# Patient Record
Sex: FEMALE | Race: ASIAN | NOT HISPANIC OR LATINO | URBAN - METROPOLITAN AREA
[De-identification: names, ages, dates, MRNs, and addresses within clinical notes are randomized per-mention and may not be internally consistent; named-entity substitution may affect disease eponyms.]

---

## 2018-05-25 ENCOUNTER — EMERGENCY (EMERGENCY)
Facility: HOSPITAL | Age: 28
LOS: 1 days | Discharge: HOME | End: 2018-05-25
Attending: EMERGENCY MEDICINE
Payer: MEDICAID

## 2018-05-25 VITALS — HEIGHT: 60 IN | WEIGHT: 115.08 LBS

## 2018-05-25 DIAGNOSIS — F32.2 MAJOR DEPRESSIVE DISORDER, SINGLE EPISODE, SEVERE WITHOUT PSYCHOTIC FEATURES: ICD-10-CM

## 2018-05-25 DIAGNOSIS — F43.10 POST-TRAUMATIC STRESS DISORDER, UNSPECIFIED: ICD-10-CM

## 2018-05-25 DIAGNOSIS — R69 ILLNESS, UNSPECIFIED: ICD-10-CM

## 2018-05-25 LAB
ALBUMIN SERPL ELPH-MCNC: 4 G/DL — SIGNIFICANT CHANGE UP (ref 3.3–5.2)
ALP SERPL-CCNC: 82 U/L — SIGNIFICANT CHANGE UP (ref 40–120)
ALT FLD-CCNC: 15 U/L — SIGNIFICANT CHANGE UP
ANION GAP SERPL CALC-SCNC: 16 MMOL/L — SIGNIFICANT CHANGE UP (ref 5–17)
APAP SERPL-MCNC: <7.5 UG/ML — LOW (ref 10–26)
AST SERPL-CCNC: 14 U/L — SIGNIFICANT CHANGE UP
BASOPHILS # BLD AUTO: 0 K/UL — SIGNIFICANT CHANGE UP (ref 0–0.2)
BASOPHILS NFR BLD AUTO: 0.3 % — SIGNIFICANT CHANGE UP (ref 0–2)
BILIRUB SERPL-MCNC: 0.5 MG/DL — SIGNIFICANT CHANGE UP (ref 0.4–2)
BUN SERPL-MCNC: 12 MG/DL — SIGNIFICANT CHANGE UP (ref 8–20)
CALCIUM SERPL-MCNC: 9.6 MG/DL — SIGNIFICANT CHANGE UP (ref 8.6–10.2)
CHLORIDE SERPL-SCNC: 102 MMOL/L — SIGNIFICANT CHANGE UP (ref 98–107)
CO2 SERPL-SCNC: 25 MMOL/L — SIGNIFICANT CHANGE UP (ref 22–29)
CREAT SERPL-MCNC: 0.63 MG/DL — SIGNIFICANT CHANGE UP (ref 0.5–1.3)
EOSINOPHIL # BLD AUTO: 0.4 K/UL — SIGNIFICANT CHANGE UP (ref 0–0.5)
EOSINOPHIL NFR BLD AUTO: 4.5 % — SIGNIFICANT CHANGE UP (ref 0–6)
GLUCOSE SERPL-MCNC: 94 MG/DL — SIGNIFICANT CHANGE UP (ref 70–115)
HCT VFR BLD CALC: 36.2 % — LOW (ref 37–47)
HGB BLD-MCNC: 11.5 G/DL — LOW (ref 12–16)
LYMPHOCYTES # BLD AUTO: 1.7 K/UL — SIGNIFICANT CHANGE UP (ref 1–4.8)
LYMPHOCYTES # BLD AUTO: 18.1 % — LOW (ref 20–55)
MCHC RBC-ENTMCNC: 27.3 PG — SIGNIFICANT CHANGE UP (ref 27–31)
MCHC RBC-ENTMCNC: 31.8 G/DL — LOW (ref 32–36)
MCV RBC AUTO: 86 FL — SIGNIFICANT CHANGE UP (ref 81–99)
MONOCYTES # BLD AUTO: 0.6 K/UL — SIGNIFICANT CHANGE UP (ref 0–0.8)
MONOCYTES NFR BLD AUTO: 6.3 % — SIGNIFICANT CHANGE UP (ref 3–10)
NEUTROPHILS # BLD AUTO: 6.6 K/UL — SIGNIFICANT CHANGE UP (ref 1.8–8)
NEUTROPHILS NFR BLD AUTO: 70.6 % — SIGNIFICANT CHANGE UP (ref 37–73)
PLATELET # BLD AUTO: 317 K/UL — SIGNIFICANT CHANGE UP (ref 150–400)
POTASSIUM SERPL-MCNC: 3.8 MMOL/L — SIGNIFICANT CHANGE UP (ref 3.5–5.3)
POTASSIUM SERPL-SCNC: 3.8 MMOL/L — SIGNIFICANT CHANGE UP (ref 3.5–5.3)
PROT SERPL-MCNC: 7.5 G/DL — SIGNIFICANT CHANGE UP (ref 6.6–8.7)
RBC # BLD: 4.21 M/UL — LOW (ref 4.4–5.2)
RBC # FLD: 13.8 % — SIGNIFICANT CHANGE UP (ref 11–15.6)
SALICYLATES SERPL-MCNC: <0.6 MG/DL — LOW (ref 10–20)
SODIUM SERPL-SCNC: 143 MMOL/L — SIGNIFICANT CHANGE UP (ref 135–145)
WBC # BLD: 9.4 K/UL — SIGNIFICANT CHANGE UP (ref 4.8–10.8)
WBC # FLD AUTO: 9.4 K/UL — SIGNIFICANT CHANGE UP (ref 4.8–10.8)

## 2018-05-25 PROCEDURE — 99284 EMERGENCY DEPT VISIT MOD MDM: CPT

## 2018-05-25 PROCEDURE — 99285 EMERGENCY DEPT VISIT HI MDM: CPT

## 2018-05-25 PROCEDURE — 93010 ELECTROCARDIOGRAM REPORT: CPT

## 2018-05-25 RX ORDER — NICOTINE POLACRILEX 2 MG
1 GUM BUCCAL DAILY
Qty: 0 | Refills: 0 | Status: DISCONTINUED | OUTPATIENT
Start: 2018-05-25 | End: 2018-05-30

## 2018-05-25 RX ORDER — TETANUS TOXOID, REDUCED DIPHTHERIA TOXOID AND ACELLULAR PERTUSSIS VACCINE, ADSORBED 5; 2.5; 8; 8; 2.5 [IU]/.5ML; [IU]/.5ML; UG/.5ML; UG/.5ML; UG/.5ML
0.5 SUSPENSION INTRAMUSCULAR ONCE
Qty: 0 | Refills: 0 | Status: COMPLETED | OUTPATIENT
Start: 2018-05-25 | End: 2018-05-25

## 2018-05-25 RX ORDER — OXYCODONE AND ACETAMINOPHEN 5; 325 MG/1; MG/1
1 TABLET ORAL ONCE
Qty: 0 | Refills: 0 | Status: DISCONTINUED | OUTPATIENT
Start: 2018-05-25 | End: 2018-05-25

## 2018-05-25 RX ADMIN — OXYCODONE AND ACETAMINOPHEN 1 TABLET(S): 5; 325 TABLET ORAL at 22:48

## 2018-05-25 RX ADMIN — OXYCODONE AND ACETAMINOPHEN 1 TABLET(S): 5; 325 TABLET ORAL at 23:53

## 2018-05-25 RX ADMIN — Medication 1 PATCH: at 19:13
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
## 2018-05-25 NOTE — ED BEHAVIORAL HEALTH ASSESSMENT NOTE - SUICIDE RISK FACTORS
Hopelessness/Highly impulsive behavior/Agitation/severe anxiety/Unable to engage in safety planning/Access to means (pills, firearms, etc.)/Chronic pain or acute medical issue/History of abuse/trauma

## 2018-05-25 NOTE — ED ADULT NURSE NOTE - SUICIDAL DETAILS
specific plan/suicidal thoughts/specific attempt/cutting her arms, attempted to drAIN BLOOD FROM HER VEIN

## 2018-05-25 NOTE — ED BEHAVIORAL HEALTH ASSESSMENT NOTE - PRIMARY DX
Deferred diagnosis on axis II Major depressive disorder, single episode, severe without psychotic features
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
## 2018-05-25 NOTE — ED ADULT NURSE NOTE - CHPI ED SYMPTOMS NEG
no disorientation/no weakness/no change in level of consciousness/no weight loss/no agitation/no confusion/no homicidal/no paranoia/no hallucinations

## 2018-05-25 NOTE — ED ADULT NURSE NOTE - SUBSTANCE USE COMMENT, PROFILE
Smoked pot as a teen, only used Heroin for last 3 weeks. Sister  from an over dose on Heroin May 9, 2017

## 2018-05-25 NOTE — ED ADULT NURSE REASSESSMENT NOTE - NS ED NURSE REASSESS COMMENT FT1
Assumed care of pt @1930. Pt reports she tried "to end it". pt reports she starting using IV heroine x3 weeks ago and has been depressed over her sister's death. Pt denies any prior SA or SIB. Attempted blood draw but was unsuccessful. Lab notified. pt has bilateral bruising and swelling to both upper extremities. Pt guarded and currently resting comfortably on stretcher. Psych NP by pt's bedside. Will continue to monitor the pt for safety. Assumed care of pt @1930. Pt reports she tried "to end it". pt reports she started using IV heroine x3 weeks ago and has been depressed over her sister's death. Pt denies any prior SA or SIB. Attempted blood draw but was unsuccessful. Lab notified. pt has bilateral bruising and swelling to both upper extremities. Pt guarded and currently resting comfortably on stretcher. Psych NP by pt's bedside. Will continue to monitor the pt for safety.

## 2018-05-25 NOTE — ED BEHAVIORAL HEALTH ASSESSMENT NOTE - DESCRIPTION
Calm, cooperative.  B/L upper extremity ecchymosis; track marks, welts, b/l superficial vertical cutting marks to b/l forearms. Trigeminal Neuralgia Lived with boyfriend
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
## 2018-05-25 NOTE — ED PROVIDER NOTE - PHYSICAL EXAMINATION
b/l upper extremity ecchymoses;  track marks, welts, b/l superficial vertical cutting marks to b/l forearms    b/l feet also have track marks and ecchymoses;     no areas of erythema, fluctuance, purulence or drainage.
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
## 2018-05-26 VITALS
RESPIRATION RATE: 18 BRPM | TEMPERATURE: 98 F | HEART RATE: 85 BPM | OXYGEN SATURATION: 99 % | DIASTOLIC BLOOD PRESSURE: 63 MMHG | SYSTOLIC BLOOD PRESSURE: 104 MMHG

## 2018-05-26 LAB
AMPHET UR-MCNC: NEGATIVE — SIGNIFICANT CHANGE UP
APPEARANCE UR: CLEAR — SIGNIFICANT CHANGE UP
BACTERIA # UR AUTO: ABNORMAL
BARBITURATES UR SCN-MCNC: NEGATIVE — SIGNIFICANT CHANGE UP
BENZODIAZ UR-MCNC: NEGATIVE — SIGNIFICANT CHANGE UP
BILIRUB UR-MCNC: NEGATIVE — SIGNIFICANT CHANGE UP
COCAINE METAB.OTHER UR-MCNC: POSITIVE
COLOR SPEC: YELLOW — SIGNIFICANT CHANGE UP
DIFF PNL FLD: NEGATIVE — SIGNIFICANT CHANGE UP
EPI CELLS # UR: SIGNIFICANT CHANGE UP
GLUCOSE UR QL: NEGATIVE MG/DL — SIGNIFICANT CHANGE UP
KETONES UR-MCNC: ABNORMAL
LEUKOCYTE ESTERASE UR-ACNC: ABNORMAL
METHADONE UR-MCNC: NEGATIVE — SIGNIFICANT CHANGE UP
NITRITE UR-MCNC: NEGATIVE — SIGNIFICANT CHANGE UP
OPIATES UR-MCNC: POSITIVE
PCP SPEC-MCNC: SIGNIFICANT CHANGE UP
PCP UR-MCNC: NEGATIVE — SIGNIFICANT CHANGE UP
PH UR: 6.5 — SIGNIFICANT CHANGE UP (ref 5–8)
PROT UR-MCNC: 30 MG/DL
RBC CASTS # UR COMP ASSIST: SIGNIFICANT CHANGE UP /HPF (ref 0–4)
SP GR SPEC: 1.02 — SIGNIFICANT CHANGE UP (ref 1.01–1.02)
THC UR QL: NEGATIVE — SIGNIFICANT CHANGE UP
UROBILINOGEN FLD QL: 8 MG/DL
WBC UR QL: ABNORMAL

## 2018-05-26 PROCEDURE — 81001 URINALYSIS AUTO W/SCOPE: CPT

## 2018-05-26 PROCEDURE — 93005 ELECTROCARDIOGRAM TRACING: CPT

## 2018-05-26 PROCEDURE — 36415 COLL VENOUS BLD VENIPUNCTURE: CPT

## 2018-05-26 PROCEDURE — 99285 EMERGENCY DEPT VISIT HI MDM: CPT

## 2018-05-26 PROCEDURE — 80307 DRUG TEST PRSMV CHEM ANLYZR: CPT

## 2018-05-26 PROCEDURE — 80053 COMPREHEN METABOLIC PANEL: CPT

## 2018-05-26 PROCEDURE — 85027 COMPLETE CBC AUTOMATED: CPT

## 2018-05-26 RX ORDER — AMITRIPTYLINE HCL 25 MG
10 TABLET ORAL ONCE
Qty: 0 | Refills: 0 | Status: COMPLETED | OUTPATIENT
Start: 2018-05-26 | End: 2018-05-26

## 2018-05-26 RX ORDER — CLONAZEPAM 1 MG
0.5 TABLET ORAL DAILY
Qty: 0 | Refills: 0 | Status: COMPLETED | OUTPATIENT
Start: 2018-05-26 | End: 2018-06-02

## 2018-05-26 RX ORDER — AMITRIPTYLINE HCL 25 MG
10 TABLET ORAL AT BEDTIME
Qty: 0 | Refills: 0 | Status: DISCONTINUED | OUTPATIENT
Start: 2018-05-26 | End: 2018-05-30

## 2018-05-26 RX ORDER — CLONAZEPAM 1 MG
1 TABLET ORAL
Qty: 0 | Refills: 0 | COMMUNITY

## 2018-05-26 RX ORDER — OXYCODONE AND ACETAMINOPHEN 5; 325 MG/1; MG/1
2 TABLET ORAL EVERY 12 HOURS
Qty: 0 | Refills: 0 | Status: COMPLETED | OUTPATIENT
Start: 2018-05-26 | End: 2018-06-02

## 2018-05-26 RX ORDER — CLONAZEPAM 1 MG
0.5 TABLET ORAL ONCE
Qty: 0 | Refills: 0 | Status: DISCONTINUED | OUTPATIENT
Start: 2018-05-26 | End: 2018-05-26

## 2018-05-26 RX ORDER — OXYCODONE AND ACETAMINOPHEN 5; 325 MG/1; MG/1
2 TABLET ORAL EVERY 24 HOURS
Qty: 0 | Refills: 0 | Status: DISCONTINUED | OUTPATIENT
Start: 2018-05-26 | End: 2018-05-26

## 2018-05-26 RX ORDER — OXYCODONE AND ACETAMINOPHEN 5; 325 MG/1; MG/1
1 TABLET ORAL ONCE
Qty: 0 | Refills: 0 | Status: DISCONTINUED | OUTPATIENT
Start: 2018-05-26 | End: 2018-05-26

## 2018-05-26 RX ADMIN — OXYCODONE AND ACETAMINOPHEN 2 TABLET(S): 5; 325 TABLET ORAL at 09:25

## 2018-05-26 RX ADMIN — Medication 0.5 MILLIGRAM(S): at 00:39

## 2018-05-26 RX ADMIN — OXYCODONE AND ACETAMINOPHEN 1 TABLET(S): 5; 325 TABLET ORAL at 00:39

## 2018-05-26 RX ADMIN — OXYCODONE AND ACETAMINOPHEN 1 TABLET(S): 5; 325 TABLET ORAL at 01:26

## 2018-05-26 RX ADMIN — OXYCODONE AND ACETAMINOPHEN 2 TABLET(S): 5; 325 TABLET ORAL at 10:20

## 2018-05-26 RX ADMIN — Medication 10 MILLIGRAM(S): at 00:39

## 2018-05-26 RX ADMIN — Medication 0.5 MILLIGRAM(S): at 09:27

## 2018-05-26 NOTE — ED ADULT NURSE REASSESSMENT NOTE - NS ED NURSE REASSESS COMMENT FT1
pt currently resting/sleeping comfortably, in no apparent discomfort or distress. Respirations even & unlabored. No pt complaints at this time. No harm to self. Safety maintained. Will continue to monitor the pt for safety.

## 2018-05-26 NOTE — ED ADULT NURSE REASSESSMENT NOTE - NS ED NURSE REASSESS COMMENT FT1
Patient received Percocet 2 tablets PO at 0925 for chronic nerve damage pain rated an 8.  Pain level reduced to manageable level post intervention.  Patient able to eat breakfast after intervention.  No attempts to harm self or others.  Agrees with inpatient transfer to psychiatric facility.  Awaiting acceptance to inpatient unit.

## 2018-05-26 NOTE — ED ADULT NURSE REASSESSMENT NOTE - STATUS
awaiting consult
awaiting transfer, no change

## 2018-05-26 NOTE — ED ADULT NURSE REASSESSMENT NOTE - NS ED NURSE REASSESS COMMENT FT1
Assumed care of patient at 0715.  Patient received resting in bed awake.  Patient endorsed she has been unable to sleep.  Educated about plan of care and need for urine sample.  Patient ambulated to bathroom independently and provided urine.  No attempts to harm self or others.  Patient agrees with plan for pending transfer.  Safety of patient maintained.

## 2018-05-26 NOTE — ED BEHAVIORAL HEALTH NOTE - BEHAVIORAL HEALTH NOTE
Social Work Note: as per psych NP patient will benefit from inpatient mental health treatment at this time. Clinicals were faxed to [facility] for review. sw to follow.

## 2018-05-26 NOTE — ED ADULT NURSE REASSESSMENT NOTE - COMFORT CARE
darkened lights/meal provided/po fluids offered/repositioned/wait time explained
darkened lights
warm blanket provided/darkened lights/wait time explained/plan of care explained/repositioned/po fluids offered
meal provided/plan of care explained
ambulated to bathroom/plan of care explained

## 2018-05-26 NOTE — ED ADULT NURSE REASSESSMENT NOTE - GENERAL PATIENT STATE
comfortable appearance/resting/sleeping/cooperative
no change observed/cooperative/comfortable appearance/resting/sleeping
resting/sleeping/anxious
resting/sleeping/cooperative
comfortable appearance/resting/sleeping/cooperative

## 2018-05-26 NOTE — ED BEHAVIORAL HEALTH NOTE - BEHAVIORAL HEALTH NOTE
Social Work Note: [facility] (formerly [facility]) has accepted patient at this time for inpatient mental health treatment. This writer spoke with Kathy in Admissions. Patient was transferred to [facility] accepting is Dr. Moya 9.13 status. [facility] was scheduled for trip.

## 2018-05-26 NOTE — ED BEHAVIORAL HEALTH NOTE - BEHAVIORAL HEALTH NOTE
Patient requesting medication for pain. Home medications confirmed with [facility] pharmacy in [place]. Patient is prescribed Amitriptyline 10 mg QHS, Klonopin 0.25 mg ODT daily and Percocet 10mg /325 mg BID. Last prescribed 5/17/18. Medications ordered.

## 2018-05-26 NOTE — ED ADULT NURSE REASSESSMENT NOTE - NS ED NURSE REASSESS COMMENT FT1
Pt resting/sleeping comfortably on stretcher, in no apparent distress. No harm to self. Safety maintained. Urine sample pending. Will continue to monitor the pt and maintain safety.

## 2018-09-01 ENCOUNTER — OUTPATIENT (OUTPATIENT)
Dept: OUTPATIENT SERVICES | Facility: HOSPITAL | Age: 28
LOS: 1 days | End: 2018-09-01
Payer: MEDICAID

## 2018-09-01 PROCEDURE — G9001: CPT

## 2018-09-11 ENCOUNTER — EMERGENCY (EMERGENCY)
Facility: HOSPITAL | Age: 28
LOS: 1 days | Discharge: LEFT BEFORE DISCHARGE | End: 2018-09-11

## 2018-09-11 VITALS — WEIGHT: 119.93 LBS

## 2018-09-12 PROBLEM — S04.30XA INJURY OF TRIGEMINAL NERVE, UNSPECIFIED SIDE, INITIAL ENCOUNTER: Chronic | Status: ACTIVE | Noted: 2018-05-25

## 2018-09-12 PROBLEM — F32.9 MAJOR DEPRESSIVE DISORDER, SINGLE EPISODE, UNSPECIFIED: Chronic | Status: ACTIVE | Noted: 2018-05-25

## 2018-09-14 DIAGNOSIS — Z71.89 OTHER SPECIFIED COUNSELING: ICD-10-CM

## 2021-04-06 ENCOUNTER — EMERGENCY (EMERGENCY)
Facility: HOSPITAL | Age: 31
LOS: 1 days | Discharge: HOME | End: 2021-04-06
Attending: PERSONAL EMERGENCY RESPONSE ATTENDANT
Payer: MEDICAID

## 2021-04-06 VITALS
SYSTOLIC BLOOD PRESSURE: 110 MMHG | DIASTOLIC BLOOD PRESSURE: 80 MMHG | HEART RATE: 80 BPM | RESPIRATION RATE: 18 BRPM | TEMPERATURE: 98 F | OXYGEN SATURATION: 98 %

## 2021-04-06 VITALS — WEIGHT: 145.06 LBS | HEIGHT: 60 IN

## 2021-04-06 LAB
ALBUMIN SERPL ELPH-MCNC: 4.2 G/DL — SIGNIFICANT CHANGE UP (ref 3.3–5)
ALP SERPL-CCNC: 66 U/L — SIGNIFICANT CHANGE UP (ref 40–120)
ALT FLD-CCNC: 8 U/L — LOW (ref 10–45)
ANION GAP SERPL CALC-SCNC: 11 MMOL/L — SIGNIFICANT CHANGE UP (ref 5–17)
APAP SERPL-MCNC: <15 UG/ML — SIGNIFICANT CHANGE UP (ref 10–30)
AST SERPL-CCNC: 14 U/L — SIGNIFICANT CHANGE UP (ref 10–40)
BASOPHILS # BLD AUTO: 0.05 K/UL — SIGNIFICANT CHANGE UP (ref 0–0.2)
BASOPHILS NFR BLD AUTO: 0.5 % — SIGNIFICANT CHANGE UP (ref 0–2)
BILIRUB SERPL-MCNC: 0.2 MG/DL — SIGNIFICANT CHANGE UP (ref 0.2–1.2)
BUN SERPL-MCNC: 15 MG/DL — SIGNIFICANT CHANGE UP (ref 7–23)
CALCIUM SERPL-MCNC: 8.9 MG/DL — SIGNIFICANT CHANGE UP (ref 8.4–10.5)
CHLORIDE SERPL-SCNC: 105 MMOL/L — SIGNIFICANT CHANGE UP (ref 96–108)
CO2 SERPL-SCNC: 21 MMOL/L — LOW (ref 22–31)
CREAT SERPL-MCNC: 0.63 MG/DL — SIGNIFICANT CHANGE UP (ref 0.5–1.3)
EOSINOPHIL # BLD AUTO: 1.22 K/UL — HIGH (ref 0–0.5)
EOSINOPHIL NFR BLD AUTO: 13 % — HIGH (ref 0–6)
ETHANOL SERPL-MCNC: SIGNIFICANT CHANGE UP MG/DL (ref 0–10)
GLUCOSE SERPL-MCNC: 86 MG/DL — SIGNIFICANT CHANGE UP (ref 70–99)
HCT VFR BLD CALC: 36.8 % — SIGNIFICANT CHANGE UP (ref 34.5–45)
HGB BLD-MCNC: 12.2 G/DL — SIGNIFICANT CHANGE UP (ref 11.5–15.5)
IMM GRANULOCYTES NFR BLD AUTO: 0.2 % — SIGNIFICANT CHANGE UP (ref 0–1.5)
LYMPHOCYTES # BLD AUTO: 1.75 K/UL — SIGNIFICANT CHANGE UP (ref 1–3.3)
LYMPHOCYTES # BLD AUTO: 18.7 % — SIGNIFICANT CHANGE UP (ref 13–44)
MCHC RBC-ENTMCNC: 31.4 PG — SIGNIFICANT CHANGE UP (ref 27–34)
MCHC RBC-ENTMCNC: 33.2 GM/DL — SIGNIFICANT CHANGE UP (ref 32–36)
MCV RBC AUTO: 94.6 FL — SIGNIFICANT CHANGE UP (ref 80–100)
MONOCYTES # BLD AUTO: 0.56 K/UL — SIGNIFICANT CHANGE UP (ref 0–0.9)
MONOCYTES NFR BLD AUTO: 6 % — SIGNIFICANT CHANGE UP (ref 2–14)
NEUTROPHILS # BLD AUTO: 5.76 K/UL — SIGNIFICANT CHANGE UP (ref 1.8–7.4)
NEUTROPHILS NFR BLD AUTO: 61.6 % — SIGNIFICANT CHANGE UP (ref 43–77)
NRBC # BLD: 0 /100 WBCS — SIGNIFICANT CHANGE UP (ref 0–0)
PLATELET # BLD AUTO: 281 K/UL — SIGNIFICANT CHANGE UP (ref 150–400)
POTASSIUM SERPL-MCNC: 4.1 MMOL/L — SIGNIFICANT CHANGE UP (ref 3.5–5.3)
POTASSIUM SERPL-SCNC: 4.1 MMOL/L — SIGNIFICANT CHANGE UP (ref 3.5–5.3)
PROT SERPL-MCNC: 6.8 G/DL — SIGNIFICANT CHANGE UP (ref 6–8.3)
RBC # BLD: 3.89 M/UL — SIGNIFICANT CHANGE UP (ref 3.8–5.2)
RBC # FLD: 12.9 % — SIGNIFICANT CHANGE UP (ref 10.3–14.5)
SALICYLATES SERPL-MCNC: <2 MG/DL — LOW (ref 15–30)
SODIUM SERPL-SCNC: 137 MMOL/L — SIGNIFICANT CHANGE UP (ref 135–145)
WBC # BLD: 9.36 K/UL — SIGNIFICANT CHANGE UP (ref 3.8–10.5)
WBC # FLD AUTO: 9.36 K/UL — SIGNIFICANT CHANGE UP (ref 3.8–10.5)

## 2021-04-06 PROCEDURE — 80307 DRUG TEST PRSMV CHEM ANLYZR: CPT

## 2021-04-06 PROCEDURE — 93005 ELECTROCARDIOGRAM TRACING: CPT

## 2021-04-06 PROCEDURE — 99285 EMERGENCY DEPT VISIT HI MDM: CPT

## 2021-04-06 PROCEDURE — 80053 COMPREHEN METABOLIC PANEL: CPT

## 2021-04-06 PROCEDURE — 93010 ELECTROCARDIOGRAM REPORT: CPT

## 2021-04-06 PROCEDURE — 85025 COMPLETE CBC W/AUTO DIFF WBC: CPT

## 2021-04-06 PROCEDURE — 84443 ASSAY THYROID STIM HORMONE: CPT

## 2021-04-06 RX ORDER — OXYCODONE HYDROCHLORIDE 5 MG/1
5 TABLET ORAL ONCE
Refills: 0 | Status: DISCONTINUED | OUTPATIENT
Start: 2021-04-06 | End: 2021-04-06

## 2021-04-06 RX ORDER — CYCLOBENZAPRINE HYDROCHLORIDE 10 MG/1
5 TABLET, FILM COATED ORAL ONCE
Refills: 0 | Status: COMPLETED | OUTPATIENT
Start: 2021-04-06 | End: 2021-04-06

## 2021-04-06 RX ORDER — IBUPROFEN 200 MG
400 TABLET ORAL ONCE
Refills: 0 | Status: COMPLETED | OUTPATIENT
Start: 2021-04-06 | End: 2021-04-06

## 2021-04-06 RX ORDER — ACETAMINOPHEN 500 MG
975 TABLET ORAL ONCE
Refills: 0 | Status: COMPLETED | OUTPATIENT
Start: 2021-04-06 | End: 2021-04-06

## 2021-04-06 RX ORDER — CYCLOBENZAPRINE HYDROCHLORIDE 10 MG/1
1 TABLET, FILM COATED ORAL
Qty: 15 | Refills: 0
Start: 2021-04-06 | End: 2021-04-10

## 2021-04-06 RX ADMIN — OXYCODONE HYDROCHLORIDE 5 MILLIGRAM(S): 5 TABLET ORAL at 21:33

## 2021-04-06 RX ADMIN — Medication 975 MILLIGRAM(S): at 22:57

## 2021-04-06 RX ADMIN — CYCLOBENZAPRINE HYDROCHLORIDE 5 MILLIGRAM(S): 10 TABLET, FILM COATED ORAL at 22:57

## 2021-04-06 NOTE — ED PROVIDER NOTE - CLINICAL SUMMARY MEDICAL DECISION MAKING FREE TEXT BOX
Orlando OQUENDO MD PGY3: 30 F BIBEMS per EMS because patient either overdosed or threatened to overdose of percocet. In speaking to patient, she fell down some stairs a month ago and states that she has pain secondary to her elbow pain and wrist pain on her left arm without obvious deformity and per patient has received xrays and MRIs in the past. Patient states that if ex boyfirned can pick her up, she would like to go home that way. Is comfortable going home with boyfriend. Denies abuse.

## 2021-04-06 NOTE — ED PROVIDER NOTE - ATTENDING CONTRIBUTION TO CARE
Attending MD Aguilar.  Agree with above.  Pt is a 29 yo female bibems for psych eval after boyfriend called crisis team after pt threatened to hurt herself and took an unknown amount of Percocet ~1 hr ago. Attending MD Aguilar.  Agree with above.  Pt is a 31 yo female bibems for psych eval after boyfriend called crisis team after pt threatened to hurt herself and took an unknown amount of Percocet ~1 hr ago.  Pt denies ingestion and has no clinical evidence of toxidrome.  She denies SI/Suicide attempt.  Denies taking percocet today.  States that breakup with BF has been complicated and denies need for assessment.  Pt's boyfriend called by Dr. Nieves and BF states he called because pt has 'not been sleeping'.  No report of SI or attempt.  Pt endorses feeling safe with discharge.  She is clinically sober without lab evidence of percocet/acetam ingestion.  Pt hemodynamically stable without evidence to support SI or attempt.  Pt requesting discharge and stable for d/c at this time.  Made aware that she may return at any time for new/worsening sxs.
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
## 2021-04-06 NOTE — ED PROVIDER NOTE - PHYSICAL EXAMINATION
Orlando OQUENDO MD PGY3:   PHYSICAL EXAM:    GENERAL: NAD, well-developed  HEENT:  Atraumatic, Normocephalic  CHEST/LUNG: Chest rise equal bilaterally. CTAB.   HEART: Regular rate and rhythm. No murmurs or rubs.   ABDOMEN: Soft, Nontender, Nondistended  EXTREMITIES:  2+ Peripheral Pulses.  PSYCH: A&Ox3. Tangential but redirectable. No SI or HI. No audio or visual hallucinations. States that she does feel sad 2/2 breaking up with boyfriend.   SKIN: No obvious rashes or lesions  MSK: L arm in sling, no obvious deformities. Paravertebral back TTP. Strength and sensation intact in bilateral lower extremities. Ambulatory without difficulty.

## 2021-04-06 NOTE — ED ADULT TRIAGE NOTE - IDEAL BODY WEIGHT(KG)
I have not seen this patient since July 2019. Melatonin is not on her medication list.  She was evaluated by Dr. George at a nursing home visit in December.  The doctor who is managing her care should refill this prescription.  Dr. George is not in the clinic today.  Call the nursing home with this information.  You may need to contact her family to find out where she is living now.   46

## 2021-04-06 NOTE — ED ADULT NURSE NOTE - OBJECTIVE STATEMENT
30 year old female brought in via EMS for possible SI. As per the patient she recently broke up with her boyfriend. Today he boy friend got mad and called the suicide hotline claiming that she took a bunch of her percocet attempting to kill herself. Pt is known HIV positive and takes Truvada and Isentress. The patient appears alert and oriented able to conversate appropriately. Pt does not appear to be under the influence of any drugs or alcohol. Pt speech is clear. Breathing is clear and unlabored. Pt denies any chest pain pressure, or palpitations. As per the patient she only took one of her percocet this morning for her pain in her left arm. Pt states she had an injury where she hurt her left arm and her back. Pt is wearing a sling and a wrist brace on her left arm. Pt states that she does not want to kill herself, she does not have a plan. Pt does endorse that she has attempted to kill herself once in the past following her sisters death, which was traumatic. Pt denies any homicidal ideation.

## 2021-04-06 NOTE — ED PROVIDER NOTE - NSFOLLOWUPINSTRUCTIONS_ED_ALL_ED_FT
Please return to the ED for any concerns. If you feel depressed or upset, please return to the ED or go to our crisis center.    You may take tylenol 1000mg and ibuprofen 400mg every 6 hours as needed for pain. You have been sent flexeril to your pharmacy.    Phone  [phone]    Our services  We offer short-term, voluntary outpatient psychiatric services to adults 18 years of age and older in times of mental health crises. This means that we provide services for a maximum of 30 days while we work to connect you with long-term providers.    Services we offer during this period include:    Crisis psychotherapy  Diagnostic evaluations  Medication management  Coordination of care  Referral and connection to long-term outpatient mental health care  Please note that our clinic is unable to provide:     Evaluations and/or letters excusing you from work or school  Clearance to return to work or school  Forensic evaluations  Disability evaluations and paperwork  Prescriptions for stimulants (such as Adderall), Xanax and pain medications  If you have recent changes in your physical health, or have immediate concerns about withdrawing from a substance or alcohol, please talk to your regular doctor or seek assessment at your nearest emergency department before coming to our clinic.     Need care for a child or adolescent?  The [facility]’s [facility] provides timely access to mental health services for children and adolescents (ages 5 through 17) experiencing a mental health crisis.     Learn more  What to expect  We see new patients on a walk-in basis. Though we are open from 9am to 7pm Monday through Friday, we recommend arriving before 5pm for your first visit, as initial visits can take two to three hours to complete.    Initial visits include:    Registration—Our staff will check you in and confirm your insurance.    Pre-assessment—A  will measure your blood pressure and temperature, and one of our mental health staff will ask you a few questions to get a general sense of why you are here today.    Forms—You’ll be asked to fill out some forms, such as rating scales and HIPAA releases. These contain standardized questions that we ask everyone.    Clinical assessment—You will meet with one of our licensed clinical social workers who are specialized in mental health assessment and diagnosis.    Medication assessment (as needed)—If you are interested in starting medications, you’ll meet with one of our physicians or nurse practitioners to discuss your needs. We usually provide medical services on the same day as your clinical assessment, but may ask you to return the next business day if the clinic is very busy.     Initial referral—You will meet with one of our licensed mental health counselors, who will help coordinate your referrals for long-term mental health care.    Checkout—Please stop by the  before you leave to ensure that your visit is complete and schedule any necessary follow-up appointments.    Pricing & insurance  As a part of [facility], our Crisis Center accepts most health insurance plans—including Medicare and Medicaid.    To avoid surprise bills, view our full list of insurance plans accepted by [facility]. Financial assistance is available for those who qualify; please call us for more information or speak to our staff when registering.    Directions  The Behavioral Health Crisis Center is located on the first floor of the [facility], within the campus of [facility]. The main entrance to our building is at the corner of [address] [place] [address] and [address] in [place], [place]. You can also access our campus through the hospital entrance at [address] [place], [place] [address] . The hospital entrance offers a free self-parking lot; the th [address] entrance is street parking only.          [facility]  [phone]  -[address] [place] [address]

## 2021-04-06 NOTE — ED PROVIDER NOTE - PATIENT PORTAL LINK FT
You can access the FollowMyHealth Patient Portal offered by [facility] by registering at the following website: [email or web address]. By joining [facility]’s FollowMyHealth portal, you will also be able to view your health information using other applications (apps) compatible with our system.

## 2021-04-06 NOTE — ED PROVIDER NOTE - HIV OFFER
Consent: Written consent obtained and the risks of skin tag removal was reviewed with the patient including but not limited to bleeding, pigmentary change, infection, pain, and remote possibility of scarring. Add Associated Diagnoses If Applicable When Selecting Medical Necessity: Yes Include Z78.9 (Other Specified Conditions Influencing Health Status) As An Associated Diagnosis?: No Anesthesia Volume In Cc: 3 Medical Necessity Clause: This procedure was medically necessary because the lesions that were treated were: Detail Level: Detailed Medical Necessity Information: It is in your best interest to select a reason for this procedure from the list below. All of these items fulfill various CMS LCD requirements except the new and changing color options. Opt out

## 2021-04-07 LAB — TSH SERPL-MCNC: 0.12 UIU/ML — LOW (ref 0.27–4.2)

## 2021-04-07 NOTE — ED POST DISCHARGE NOTE - DETAILS
4/7/21: M for pt with admin # for callback. will reattempt tomorrow - Octavio Drake PA-C 4/8/21: pt lvm for call back to discuss results on after hours  box, asked to call [phone]. I called this number but no answer, I lvm for call back. -Ray Humphrey PA-C 4/8/21: Pt called back, TSH results d/w pt, recommended f/u with PCP to discuss results and for further testing. -Ray Humphrey PA-C

## 2021-04-15 ENCOUNTER — APPOINTMENT (OUTPATIENT)
Dept: ORTHOPEDIC SURGERY | Facility: CLINIC | Age: 31
End: 2021-04-15
Payer: MEDICAID

## 2021-04-15 VITALS
HEART RATE: 85 BPM | DIASTOLIC BLOOD PRESSURE: 68 MMHG | WEIGHT: 120 LBS | HEIGHT: 60 IN | BODY MASS INDEX: 23.56 KG/M2 | SYSTOLIC BLOOD PRESSURE: 121 MMHG

## 2021-04-15 DIAGNOSIS — F17.200 NICOTINE DEPENDENCE, UNSPECIFIED, UNCOMPLICATED: ICD-10-CM

## 2021-04-15 DIAGNOSIS — G56.02 CARPAL TUNNEL SYNDROME, LEFT UPPER LIMB: ICD-10-CM

## 2021-04-15 PROCEDURE — 99204 OFFICE O/P NEW MOD 45 MIN: CPT | Mod: 25

## 2021-04-15 PROCEDURE — 99072 ADDL SUPL MATRL&STAF TM PHE: CPT

## 2021-04-15 PROCEDURE — 20526 THER INJECTION CARP TUNNEL: CPT | Mod: LT

## 2021-04-16 NOTE — ADDENDUM
[FreeTextEntry1] : I, Tami Florez acted solely as a scribe for Dr. Senait Cano on 04/15/2021. \par \par All medical record entries made by the Scribe were at my, Dr. Senait Cano, direction and personally dictated by me on 04/15/2021. I have personally reviewed the chart and agree that the record accurately reflects my personal performance of the history, physical exam, assessment and plan.

## 2021-04-16 NOTE — PHYSICAL EXAM
[de-identified] : Patient is alert, oriented and in no acute distress. Affect and general appearance are normal and patient is able to answer questions appropriately. On the left,  strength is diminished compared to the opposite side. Patient is able to make a tight fist. A focused exam of the upper extremities reveals full painless motion of the elbow, wrist and fingers. Full pronation and supination. Negative Tinel's over the ulnar nerve at the elbow. Wrist extension to 90 degrees, and flexion to 60 degrees with significant co-contraction. Not significantly tender at the ulnar fovea. Slightly tender over the pisiform. Slightly tender at the dorsal wrist capsule. Negative thumb grind test.  Weakly positive Phalen test and positive TInel's over median nerve at the wrist.\par \par Neurologic: Median, ulnar, and radial motor and sensory are intact. \par Skin: No cyanosis, clubbing, edema or rashes. \par Vascular: Radial pulses intact. \par Lymphatic: No streaking or epitrochlear adenopathy. \par

## 2021-04-16 NOTE — ASSESSMENT
[FreeTextEntry1] : Patient is a 31 year old female with a fall down steps twice this winter with pain at the left shoulder, elbow and wrist, with an MRI of the wrist showing partial thickness tears of the SL ligament and TFCC without pain localizing there on exam today, but with some carpal tunnel symptoms. We talked about the nature of the condition and treatment options. I do think that a significant portion of her pain is coming from nerve compression at her wrist. The patient has elected to receive a steroid injection into the left carpal tunnel today. She understands that it may take up to 48 hours for the steroid to begin working and will continue to decrease inflammation over the course of one week. She has a wrist brace that she will wear it as instructed. If there is no significant improvement in symptoms, or a recurrence at any time, the patient will return to see me and we can discuss options of a second steroid injection versus operative intervention.\par \par She will also begin physical therapy for aggressive range of motion and progressive strengthening through the elbow, wrist and shoulder. A script was given. She was reassured that she can continue to work and do normal activities as tolerated. Follow up in 6 weeks for re-evaluation.\par

## 2021-04-16 NOTE — HISTORY OF PRESENT ILLNESS
[FreeTextEntry1] : Patient is a 31 year-old, right-hand-dominant female who presents to the office for initial evaluation of left wrist, elbow and shoulder pain. She fell outside on 1/2021 on the ice. In late 2/2021, she feel down the stairs when her knee gave out. She caught herself on her left hand in hyperextension to prevent any head injury. She cites that she has" no circulation" throughout her left upper extremity. She cites weakness as well as n/t in all of the fingertips. She reports that her triceps muscle remains weak at all time. Warm showers reportedly help. She has braces that provide her relief. She has not had any significant treatment till date. \par \par  She has obtained x-rays, EMG and an MRI of the upper left extremity. She has brought the MRI and the \par \par She is referred here by Dr. Adrian.  She works a

## 2021-04-22 ENCOUNTER — APPOINTMENT (OUTPATIENT)
Dept: ORTHOPEDIC SURGERY | Facility: CLINIC | Age: 31
End: 2021-04-22
Payer: MEDICAID

## 2021-04-22 DIAGNOSIS — M79.602 PAIN IN LEFT ARM: ICD-10-CM

## 2021-04-22 PROCEDURE — 99214 OFFICE O/P EST MOD 30 MIN: CPT

## 2021-04-22 PROCEDURE — 99072 ADDL SUPL MATRL&STAF TM PHE: CPT

## 2021-04-22 PROCEDURE — 73030 X-RAY EXAM OF SHOULDER: CPT | Mod: LT

## 2021-04-23 PROBLEM — M79.602 LEFT ARM PAIN: Status: ACTIVE | Noted: 2021-04-23

## 2021-04-23 NOTE — ADDENDUM
[FreeTextEntry1] : This note was written by Shanel Olmos on 04/22/2021 acting solely as a scribe for Dr. Yehuda Shea.\par \par All medical record entries made by the Scribe were at my, Dr. Yehuda Shea, direction and personally dictated by me on 04/22/2021. I have personally reviewed the chart and agree that the record accurately reflects my personal performance of the history, physical exam, assessment and plan.

## 2021-04-23 NOTE — HISTORY OF PRESENT ILLNESS
[de-identified] : 31 year old RHD female presents today with left arm pain since March 2021. She fell down stairs at that time, and has had severe left upper extremity pain since her injury. She was seen at [facility], and obtained MRI's of the elbow/shoulder/wrist but never followed up for results.  Recently saw Dr. Cano who provided a CT injection with some moderate short term relief.  Pain in the upper extremity is described as radiating from the shoulder.  She has pain through the posterior arm/elbow which radiates within the forearm and hand.  Shoulder pain is intermittent brought on with internal rotation and overhead movements. Meloxicam/muscle relaxers is helpful. Has Rx for physical therapy and will start tomorrow. Reports constant tingling and neurologic sequela within the arm. \par \par The patient's past medical history, past surgical history, medications and allergies were reviewed by me today with the patient and documented accordingly. In addition, the patient's family and social history, which were noncontributory to this visit, were reviewed also.

## 2021-04-23 NOTE — PHYSICAL EXAM
[de-identified] : Oriented to time, place, person\par Mood: Normal\par Affect: Normal\par Appearance: Healthy, well appearing, no acute distress.\par Gait: Normal\par Assistive Devices: None\par \par Left shoulder exam:\par \par Inspection: No malalignment, No defects, No atrophy\par Skin: No masses, No lesions\par Neck: Negative Spurling, full ROM, no pain with ROM\par AROM: FF to 180, abduction to 90, ER to 70, IR to lower lumbar\par Painful arc ROM: none\par Tenderness: No bicipital tenderness, no tenderness to greater tuberosity/RTC insertion, no anterior shoulder/lesser tuberosity tenderness\par Strength: 5/5 ER, 5/5 IR in adduction, 5/5 supraspinatus testing, negative Sangamon's test\par AC joint: No TTP/pain with cross arm testing\par Biceps: Speed Negative, Yergason Negative \par Impingement test: Negative Marin, Negative Neer\par Vasc: 2+ radial pulse \par Stability: Stable \par Neuro: AIN, PIN, Ulnar nerve intact to motor\par Sensation: Intact to light touch throughout  [de-identified] : The following radiographs were ordered and read by me during this patients visit. I reviewed each radiograph in detail with the patient and discussed the findings as highlighted below.\par \par 3 views of left shoulder were obtained today, 04/22/2021, that show no acute fracture or dislocation. There is no glenohumeral and no AC joint degenerative change seen. Type II acromion. There is no significant malalignment. No significant other obvious osseous abnormality, otherwise unremarkable. \par \par MRI left shoulder dated 03/20/2021 shows supraspinatus as with fraying of the articular surface and mild interstitial delamination. No RTC tear. \par \par MRI left elbow dated 03/20/2021 is unremarkable

## 2021-04-23 NOTE — DISCUSSION/SUMMARY
[de-identified] : 30 y/o female with left arm pain. \par \par The patient presents today for evaluation of left upper extremity pain.  Symptoms appear to be radiating through the upper extremity, and are nonspecific to the shoulder/elbow.  I discussed that the way she presents it does appear that the symptoms are neurologic in origin.  Patient has been recently treated by Dr. Cano for symptoms of carpal tunnel.  She has not achieving any significant relief of symptoms with anti-inflammatory/muscle relaxants.  \par \par Review of the patient's MRI of the shoulder/elbow suggest that the joints are not the cause of her current symptoms.  Shoulder MRI is benign with mild rotator cuff chronic tendinopathy, and MRI elbow is normal.  I discussed that I do not suspect that symptoms are not originating from the left shoulder or elbow, and her symptoms are more consistent with a neurologic component.\par \par Recommendation: Neurology evaluation.  Begin trial of PT. OTC NSAID's or acetaminophen as tolerated, with application of ice to the area 2-3x daily for 20 minutes, activity restriction\par \par Follow-up as needed

## 2021-06-03 ENCOUNTER — APPOINTMENT (OUTPATIENT)
Dept: ORTHOPEDIC SURGERY | Facility: CLINIC | Age: 31
End: 2021-06-03

## 2021-11-12 NOTE — ED BEHAVIORAL HEALTH ASSESSMENT NOTE - NS ED BHA CANNABIS
What Type Of Note Output Would You Prefer (Optional)?: Bullet Format
How Severe Is Your Acne?: moderate
Is This A New Presentation, Or A Follow-Up?: Acne
None known

## 2022-08-09 NOTE — ED BEHAVIORAL HEALTH ASSESSMENT NOTE - PATIENT SEEN BY
Caller: Gustabo Audra JAVIER    Relationship: Self    Best call back number: [phone]    Requested Prescriptions:   Requested Prescriptions     Pending Prescriptions Disp Refills   • atorvastatin (LIPITOR) 80 MG tablet 30 tablet 0     Sig: Take 1 tablet by mouth Every Night for 30 days.        Pharmacy where request should be sent: [id number] - [place], [place] - [address] AT [facility] [phone] [facility] [phone] FX     Additional details provided by patient: PATIENT HAS 1 DAY LEFT MEDICATION    Does the patient have less than a 3 day supply:  [x] Yes  [] No    Kiel Mcdermott Rep   08/08/22 14:25 EDT             
Sent to Dr Pina to send in refill  
NP with Telephonic supervision from Attending Psychiatrist

## 2023-06-22 ENCOUNTER — APPOINTMENT (OUTPATIENT)
Dept: INTERNAL MEDICINE | Facility: CLINIC | Age: 33
End: 2023-06-22

## 2024-01-17 NOTE — ED ADULT NURSE NOTE - NSCAGESTDRUGEYEOP_GEN_A_CORE_SD
----- Message from Ashli Tovar sent at 1/17/2024 11:35 AM CST -----  Regarding: CANCEL  Please note,   Tiffany Avalos called to cancel appointment for 1/17/2024.  Patient cancelled because PATIENT IS NOT FEELING WELL  Tiffany Avalos has another appointment scheduled.  Video visit was declined due to not available    Thank you.   Carina     
no